# Patient Record
(demographics unavailable — no encounter records)

---

## 2025-04-29 NOTE — HISTORY OF PRESENT ILLNESS
[Former] : former [Current] : current [Obstructive Sleep Apnea] : obstructive sleep apnea [TextBox_4] : 57 female  quit tobacco 3 y ago 1ppd 40 y + WTC  worked in [employer] off day of attack Dx copd and RADS  4 y ago  + admit hosp for asthma last visit  1 yr ago   + obstructive sleep apnea and to start cpap next week today feels  p[oor seasonal allergies  does not use inhaler dialy but 4x per week use advair bid but no longer has any  and use symbicort and albuterol prn albuterol neb precipitating factors  humid and hot air  ?dog  allergy and own dog  [TextBox_11] : 1 [TextBox_13] : 42 [YearQuit] : 2022 [TextBox_22] : marijuana [TextBox_27] : medical [TextBox_165] : Patient is currently getting fitted for a CPAP, with [facility]

## 2025-04-29 NOTE — DISCUSSION/SUMMARY
[FreeTextEntry1] : Ms. Espinoza has moderate multiple medical problems.  She has a long history of tobacco and has underlying COPD and exposure at [place] which can cause reactive airways disease syndrome.  She does respond to bronchodilators and anti-inflammatories so she likely has a component of both.  Her pulmonary function tests demonstrate restrictive disease secondary to her obesity.  There is a mild decreased diffusion capacity likely secondary to emphysema.  We will get a chest x-ray to fully define the anatomy.  She should not be using both Symbicort and Advair.  She will use Advair 1 spray twice a day and albuterol as needed.  I recommend loratadine 10 mg daily  during the seasonal allergies. The patient understands and agrees with plan of care. Today's office visit encompassed 62 minutes. I conducted an extensive history,physical exam and reviewed diagnosis and treatment options including diagnostic tests,radiology studies including cat scans and the use of prescription medication.

## 2025-04-29 NOTE — PROCEDURE
[FreeTextEntry1] : Pulmonary function test 4/29/2025 mild restrictive disease.  Moderate decreased diffusion capacity consistent with emphysema versus interstitial disease.

## 2025-04-29 NOTE — REASON FOR VISIT
[Initial] : an initial visit [Asthma] : asthma [Cough] : cough [COPD] : COPD [Shortness of Breath] : shortness of breath [Wheezing] : wheezing [TextBox_44] : sneezing [TextEntry] : Patient states she is a WTC survivor.  She has COPD and asthma.  Her primary has her on Symbicort as needed, Advair every day and albuterol as needed.  She has been using the Symbicort at least 3 times a week and albuterol about 4 times a week.  This weather triggers her asthma and it can get worse.  She does have phlegm cough, wheezing, SOB, chest tightness.